# Patient Record
Sex: FEMALE | Race: WHITE | NOT HISPANIC OR LATINO | URBAN - NONMETROPOLITAN AREA
[De-identification: names, ages, dates, MRNs, and addresses within clinical notes are randomized per-mention and may not be internally consistent; named-entity substitution may affect disease eponyms.]

---

## 2021-10-21 ENCOUNTER — IMPORTED ENCOUNTER (OUTPATIENT)
Dept: URBAN - NONMETROPOLITAN AREA CLINIC 1 | Facility: CLINIC | Age: 71
End: 2021-10-21

## 2021-10-21 PROBLEM — Z96.1: Noted: 2021-10-21

## 2021-10-21 PROBLEM — H43.813: Noted: 2021-10-21

## 2021-10-21 PROBLEM — H35.62: Noted: 2021-10-21

## 2021-10-21 PROBLEM — E11.3293: Noted: 2021-10-21

## 2021-10-21 PROBLEM — H16.223: Noted: 2021-10-21

## 2021-10-21 PROBLEM — H52.4: Noted: 2021-10-21

## 2021-10-21 PROCEDURE — 92015 DETERMINE REFRACTIVE STATE: CPT

## 2021-10-21 PROCEDURE — 99204 OFFICE O/P NEW MOD 45 MIN: CPT

## 2021-10-21 NOTE — PATIENT DISCUSSION
DM w/minimal BDR-Stressed the importance of keeping blood sugars under control blood pressure under control and weight normalization and regular visits with PCP. -Explained the possible effects of poorly controlled diabetes and the damage that diabetes can cause to ocular health. -Patient to check HgbA1C.-Pt instructed to contact our office with any vision changes. 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 -Will send to [facility] if too expesnive will do collagen plugs w/ATs. PVD OU-Chronic and stable. s/p PCIOL-Stable PCIOL s/p YAG Caps OU.-Monitor. Presbyopia-Discussed diagnosis with patient. Updated spec Rx given. Recommend lens that will provide comfort as well as protect safety and health of eyes.

## 2022-04-09 ASSESSMENT — VISUAL ACUITY
OS_SC: 20/20-2
OD_SC: 20/20
OU_CC: J1+

## 2022-04-09 ASSESSMENT — TONOMETRY
OD_IOP_MMHG: 19
OS_IOP_MMHG: 19